# Patient Record
Sex: MALE | Race: WHITE
[De-identification: names, ages, dates, MRNs, and addresses within clinical notes are randomized per-mention and may not be internally consistent; named-entity substitution may affect disease eponyms.]

---
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
## 2025-05-02 NOTE — HISTORY OF PRESENT ILLNESS
[___ Day(s)] : [unfilled] day(s) [Constant] : constant [Max Temp: ____] : Max temperature: [unfilled] [de-identified] : pt is here with mom for complaint of cough yes [FreeTextEntry6] : 14 yo male here w/ cough x 2 days.  Afebrile.